# Patient Record
Sex: MALE | Race: WHITE
[De-identification: names, ages, dates, MRNs, and addresses within clinical notes are randomized per-mention and may not be internally consistent; named-entity substitution may affect disease eponyms.]

---

## 2020-02-22 ENCOUNTER — HOSPITAL ENCOUNTER (INPATIENT)
Dept: HOSPITAL 25 - ED | Age: 60
LOS: 4 days | Discharge: HOME | DRG: 247 | End: 2020-02-26
Attending: SURGERY | Admitting: INTERNAL MEDICINE
Payer: COMMERCIAL

## 2020-02-22 DIAGNOSIS — Z88.0: ICD-10-CM

## 2020-02-22 DIAGNOSIS — F17.200: ICD-10-CM

## 2020-02-22 DIAGNOSIS — K56.50: Primary | ICD-10-CM

## 2020-02-22 DIAGNOSIS — Z23: ICD-10-CM

## 2020-02-22 LAB
ALBUMIN SERPL BCG-MCNC: 4.5 G/DL (ref 3.2–5.2)
ALBUMIN/GLOB SERPL: 1.6 {RATIO} (ref 1–3)
ALP SERPL-CCNC: 54 U/L (ref 34–104)
ALT SERPL W P-5'-P-CCNC: 9 U/L (ref 7–52)
ANION GAP SERPL CALC-SCNC: 10 MMOL/L (ref 2–11)
AST SERPL-CCNC: 13 U/L (ref 13–39)
BASOPHILS # BLD AUTO: 0 10^3/UL (ref 0–0.2)
BUN SERPL-MCNC: 22 MG/DL (ref 6–24)
BUN/CREAT SERPL: 22 (ref 8–20)
CALCIUM SERPL-MCNC: 9.7 MG/DL (ref 8.6–10.3)
CHLORIDE SERPL-SCNC: 95 MMOL/L (ref 101–111)
EOSINOPHIL # BLD AUTO: 0 10^3/UL (ref 0–0.6)
GLOBULIN SER CALC-MCNC: 2.9 G/DL (ref 2–4)
GLUCOSE SERPL-MCNC: 114 MG/DL (ref 70–100)
HCO3 SERPL-SCNC: 27 MMOL/L (ref 22–32)
HCT VFR BLD AUTO: 43 % (ref 42–52)
HGB BLD-MCNC: 15.1 G/DL (ref 14–18)
LYMPHOCYTES # BLD AUTO: 1.4 10^3/UL (ref 1–4.8)
MCH RBC QN AUTO: 33 PG (ref 27–31)
MCHC RBC AUTO-ENTMCNC: 35 G/DL (ref 31–36)
MCV RBC AUTO: 92 FL (ref 80–94)
MONOCYTES # BLD AUTO: 1 10^3/UL (ref 0–0.8)
NEUTROPHILS # BLD AUTO: 6.2 10^3/UL (ref 1.5–7.7)
NRBC # BLD AUTO: 0 10^3/UL
NRBC BLD QL AUTO: 0
PLATELET # BLD AUTO: 340 10^3/UL (ref 150–450)
POTASSIUM SERPL-SCNC: 3.7 MMOL/L (ref 3.5–5)
PROT SERPL-MCNC: 7.4 G/DL (ref 6.4–8.9)
RBC # BLD AUTO: 4.63 10^6 /UL (ref 4.18–5.48)
SODIUM SERPL-SCNC: 132 MMOL/L (ref 135–145)
WBC # BLD AUTO: 8.6 10^3/UL (ref 3.5–10.8)

## 2020-02-22 PROCEDURE — 36415 COLL VENOUS BLD VENIPUNCTURE: CPT

## 2020-02-22 PROCEDURE — 83690 ASSAY OF LIPASE: CPT

## 2020-02-22 PROCEDURE — 74177 CT ABD & PELVIS W/CONTRAST: CPT

## 2020-02-22 PROCEDURE — 96361 HYDRATE IV INFUSION ADD-ON: CPT

## 2020-02-22 PROCEDURE — 80048 BASIC METABOLIC PNL TOTAL CA: CPT

## 2020-02-22 PROCEDURE — 80053 COMPREHEN METABOLIC PANEL: CPT

## 2020-02-22 PROCEDURE — 85025 COMPLETE CBC W/AUTO DIFF WBC: CPT

## 2020-02-22 PROCEDURE — 96374 THER/PROPH/DIAG INJ IV PUSH: CPT

## 2020-02-22 PROCEDURE — G0378 HOSPITAL OBSERVATION PER HR: HCPCS

## 2020-02-22 PROCEDURE — 74019 RADEX ABDOMEN 2 VIEWS: CPT

## 2020-02-22 PROCEDURE — 99284 EMERGENCY DEPT VISIT MOD MDM: CPT

## 2020-02-22 PROCEDURE — 90686 IIV4 VACC NO PRSV 0.5 ML IM: CPT

## 2020-02-22 RX ADMIN — WATER SCH: 100 INJECTION, SOLUTION INTRAVENOUS at 20:36

## 2020-02-22 RX ADMIN — SODIUM CHLORIDE SCH MLS/HR: 900 IRRIGANT IRRIGATION at 13:42

## 2020-02-22 RX ADMIN — SODIUM CHLORIDE SCH MLS/HR: 900 IRRIGANT IRRIGATION at 21:12

## 2020-02-22 RX ADMIN — ONDANSETRON PRN MG: 2 INJECTION INTRAMUSCULAR; INTRAVENOUS at 21:12

## 2020-02-22 RX ADMIN — ONDANSETRON PRN MG: 2 INJECTION INTRAMUSCULAR; INTRAVENOUS at 16:54

## 2020-02-22 RX ADMIN — NICOTINE SCH PATCH: 21 PATCH TRANSDERMAL at 14:12

## 2020-02-22 NOTE — ED
Abdominal Pain/Male





- HPI Summary


HPI Summary: 





This pt is a 58 Y/O M presenting to [facility] with a CC of vomiting and abdominal 

pain since 2/18/2020. He states that the current severity is rated a 2/10 in 

severity and is described as cramping. He states that he has been constipated 

since 2/19/2020. He also reports pain in his kidneys. He denies any fevers, 

chills, headaches, and SOB. He has no alleviating or aggravating factors. He 

states that he recently has had inguinal hernias that needed 3 repairs. He 

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 

driving. Home medications reviewed. Allergies noted.




















- History of Current Complaint


Chief Complaint: Rossy


Stated Complaint: VOMITING SINCE TUESDAY PER PT


Time Seen by Provider: 02/22/20 09:35


Hx Obtained From: Patient


Onset/Duration: Sudden Onset, Still Present


Timing: Constant


Severity Initially: Mild


Severity Currently: Mild


Pain Intensity: 2


Pain Scale Used: 0-10 Numeric


Location: Diffuse


Radiates: Yes


Radiates to: Flank - bilateral


Character: Cramping


Aggravating Factor(s): Nothing


Alleviating Factor(s): Nothing


Associated Signs And Symptoms: Positive: Negative - chills, SOB, Constipation, 

Decreased Appetite, Nausea, Vomiting, Other - flank pain.  Negative: Fever





- Allergies/Home Medications


Allergies/Adverse Reactions: 


 Allergies











Allergy/AdvReac Type Severity Reaction Status Date / Time


 


Penicillins Allergy  Unknown Verified 02/22/20 09:48





   Reaction  





   Details  











Home Medications: 


 Home Medications





NK [No Home Medications Reported]  02/22/20 [History Confirmed 02/22/20]











PMH/Surg Hx/FS Hx/Imm Hx


Previously Healthy: Yes


Endocrine/Hematology History: 


   Denies: Hx Thyroid Disease


Cardiovascular History: 


   Denies: Hx Hypertension


Sensory History: Reports: Hx Contacts or Glasses


Opthamlomology History: Reports: Hx Contacts or Glasses





- Cancer History


Hx Chemotherapy: No


Hx Radiation Therapy: No





- Surgical History


Surgical History: None





- Immunization History


Immunizations Up to Date: Yes


Infectious Disease History: No


Infectious Disease History: 


   Denies: Traveled Outside the [place] in Last 30 Days





- Family History


Known Family History: Positive: Hypertension, Diabetes





- Social History


Occupation: Employed Full-time


Lives: With Family


Alcohol Use: None


Hx Substance Use: No


Substance Use Type: Reports: None


Hx Tobacco Use: Yes


Smoking Status (MU): Current Every Day Smoker





Review of Systems


Negative: Fever, Chills


Negative: Shortness Of Breath


Positive: Abdominal Pain, Vomiting, Nausea, Other - decreased appetite 


Positive: flank pain - bilateral 


Negative: Headache


All Other Systems Reviewed And Are Negative: Yes





Physical Exam





- Summary


Physical Exam Summary: 





Constitutional: Well-developed, Well-nourished, Alert. (-) Distressed


Skin: Warm, Dry, surgical scar to mid abdomen 


HENT: Normocephalic; Atraumatic


Eyes: Conjunctiva normal


Neck: Musculoskeletal ROM normal neck. (-) JVD, (-) Stridor, (-) Tracheal 

deviation


Cardio: Rhythm regular, rate normal, Heart sounds normal; Intact distal pulses; 

The pedal pulses are 2+ and symmetric. Radial pulses are 2+ and symmetric. (-) 

Murmur


Pulmonary/Chest wall: Effort normal. (-) Respiratory distress, (-) Wheezes, (-) 

Rales


Abd: Soft, mild L lower quadrant tenderness, (-) Distension, (-) Guarding, (-) 

Rebound


Musculoskeletal: (-) Edema


Lymph: (-) Cervical adenopathy


Neuro: Alert, Oriented x3


Psych: Mood and affect Normal





Triage Information Reviewed: Yes


Vital Signs On Initial Exam: 


 Initial Vitals











Temp Pulse Resp BP Pulse Ox


 


 99.0 F   100   18   153/103   98 


 


 02/22/20 09:26  02/22/20 09:26  02/22/20 09:26  02/22/20 09:26  02/22/20 09:26











Vital Signs Reviewed: Yes





Procedures





- Sedation


Patient Received Moderate/Deep Sedation with Procedure: No





Diagnostics





- Vital Signs


 Vital Signs











  Temp Pulse Resp BP Pulse Ox


 


 02/22/20 09:26  99.0 F  100  18  153/103  98














- Laboratory


Result Diagrams: 


 02/23/20 04:49





 02/23/20 04:49


Lab Statement: Any lab studies that have been ordered have been reviewed, and 

results considered in the medical decision making process.





- CT


  ** CT A/P


CT Interpretation Completed By: Radiologist


Summary of CT Findings: 1.  SMALL BOWEL OBSTRUCTION WITH A TRANSITION POINT IN 

THE LOWER ABDOMEN.  2.  ATHEROSCLEROSIS.  3.  DIVERTICULOSIS.  4.  

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  ED physician has reviewed this 

report.





Abdominal Pain Male Course/Dx





- Course


Course Of Treatment: Patient is here with vomiting and lack of bowel movement.  

Patient has a history of bowel obstructions or workup was performed.  Patient 

had a CT scan which showed a bowel obstruction.  Surgery was called and 

admitted the patient.





- Diagnoses


Provider Diagnoses: 


 Small bowel obstruction








- Provider Notifications


Discussed Care Of Patient With: Douglas Solo


Time Discussed With Above Provider: 11:25


Instructed by Provider To: Admit As Inpatient


Admit/Transition Orders Completed By ED Provider: Yes





Discharge ED





- Sign-Out/Discharge


Documenting (check all that apply): Patient Departure - admitted





- Discharge Plan


Condition: Stable


Disposition: ADMITTED TO [facility]





- Billing Disposition and Condition


Condition: STABLE


Disposition: Admitted to [facility]





- Attestation Statements


Document Initiated by Iva: Yes


Documenting Scribe: Dirk Black


Provider For Whom Iva is Documenting (Include Credential): tahir Swanson MD 


Scribe Attestation: 


Dirk SCHWARZ, scribed for tahir Swanson MD  on 02/23/20 at 0951. 


Scribe Documentation Reviewed: Yes


Provider Attestation: 


The documentation as recorded by the Dirk gonzalez accurately reflects 

the service I personally performed and the decisions made by tahir wilson MD 


Status of Scribe Document: Viewed

## 2020-02-22 NOTE — HP
CC:  [facility]; Dr. Devon Cordova's office *

 

HISTORY AND PHYSICAL:

 

DATE OF ADMISSION:  02/22/20

 

REASON FOR ADMISSION:  Small bowel obstruction.

 

HISTORY OF PRESENT ILLNESS:  Mr. Maurilio Chambers is a 59-year-old gentleman who is 
an over the road , who about 3 or 4 days ago developed some abdominal 
distention, nausea with vomiting and lower abdominal pain.  He has been passing 
minimal flatus.  He has not had a bowel movement in several days.  He presented 
to the emergency room today and was noted to be afebrile, but has a slight 
elevation in his heart rate.  His laboratory workup was essentially 
unremarkable with normal white blood cell count, electrolytes, and renal 
function.  He has normal liver transaminases and total bilirubin.

 

He underwent a CT scan of the abdominal and pelvis, which shows finding 
consistent with a small bowel obstruction.  He has had an apparent small bowel 
resection in the past (please see below), and there was some fecalization of 
some proximal small bowel dilation with distally collapsed small bowel.  There 
is no evidence of free fluid or air.  A small amount of air in the right colon.
  There was no other acute findings.

 

Of significance, he had developed what appears to have been an incarcerated/
strangulated left inguinal hernia while traveling through [place] last 
summer and presented to the [facility] there.  He is not 
certain of the exact sequence of surgeries; however, apparently he underwent an 
open repair of a left inguinal hernia (not certain if mesh was used), and 
subsequently required a laparotomy with apparent small bowel resection for 
ischemia and/or adhesive disease.  He was hospitalized at least a month and 
recovered over the next several months that he returned to his job as a .

 

His present symptoms started 4 days ago while he was in [place], but he wanted to 
drive home prior to presenting to the emergency room.

 

PAST MEDICAL HISTORY:  Tobacco abuse.

 

PAST SURGICAL HISTORY:

1.  Open left inguinal hernia repair as above.

2.  Laparotomy with small bowel resection as per above.  We do not have records 
from this surgery.

 

MEDICATIONS:  He takes no medicines at home.

 

ALLERGIES:  PENICILLINS.

 

FAMILY HISTORY:  He is unsure of any significant findings in his family.

 

SOCIAL HISTORY:  He smokes daily and admits to smoking about too much, does not 
drink alcohol.  He lives in [place] with his girlfriend.  He spends most of 
his time driving.  He denies illicit drugs.

 

REVIEW OF SYSTEMS:  A 14 point review of systems obtained and is all positive 
per above.  He denies any diabetes, heart disease, hypertension or liver 
disease.

 

                               PHYSICAL EXAMINATION

 

GENERAL:  He is a slender male, appears to be in no apparent distress.  He is 
awake, alert and conversive.

 

VITAL SIGNS:  Temperature 99, pulse 100, blood pressure 153/103.

 

HEENT:  Sclerae is anicteric.  His oral mucosa is slightly dry.  Trachea was 
midline.

 

LUNGS:  Clear to auscultation with normal respiratory effort.

 

HEART:  Regular rate and rhythm without murmurs, rubs, or gallops.

 

ABDOMEN:  Soft and not particularly distended.  He has a well healed left groin 
oblique incision without hernia.  There is no right inguinal hernia.  He has a 
small vertical midline incision from the umbilicus down to about USP to the 
suprapubic tubercle, which is well healed without hernia.  He had some bowel 
sounds, which are slightly hyperactive throughout but not high pitched or 
tinkling. He has minimal discomfort.  There is no peritoneal irritation, rebound
, or guarding.

 

PSYCH:  He is awake alert and oriented x3.  He has normal judgment and insight

 

 IMPRESSION:  Small bowel obstruction.  He has a complicated surgical history 
as per above last summer, where he had an apparent small bowel resection with a 
stapled anastomosis.  He is not certain exactly why this was done, but it was a 
follow-up operation after undergoing what sounds like an emergent repair of a 
strangulated left inguinal hernia.

 

CT scan shows findings consistent with small bowel obstruction, however, there 
is fecalization of fluid within the more proximal small bowel up towards the 
area of the anastomosis indicating this may have been more of a chronic issue 
than just the last 3 or 4 days.  Difficult to determine if the narrowing or 
transition point is at the anastomosis by the CT scan.

 

PLAN:

1.  The patient will be admitted to the surgical service.

2.  Nasogastric tube has been inserted.  We will place the low continuous 
suction.

3.  He will be allowed ice chips and we will start on aggressive IV hydration.

4.  Analgesia will be administered as needed.

5.  Repeat laboratory values in the morning.

 

We discussed all of this with the patient and his girlfriend who is present in 
the emergency room.  Hopefully, this will not require a surgical exploration.  
We will follow him closely over the next 48 to 72 hours with the above plan.

 

 

 

[id number] #: [id number]

[facility] Psych[facility] CenterMARGARITA

## 2020-02-23 LAB
ANION GAP SERPL CALC-SCNC: 7 MMOL/L (ref 2–11)
BASOPHILS # BLD AUTO: 0.1 10^3/UL (ref 0–0.2)
BUN SERPL-MCNC: 19 MG/DL (ref 6–24)
BUN/CREAT SERPL: 21.8 (ref 8–20)
CALCIUM SERPL-MCNC: 8.3 MG/DL (ref 8.6–10.3)
CHLORIDE SERPL-SCNC: 104 MMOL/L (ref 101–111)
EOSINOPHIL # BLD AUTO: 0.1 10^3/UL (ref 0–0.6)
GLUCOSE SERPL-MCNC: 84 MG/DL (ref 70–100)
HCO3 SERPL-SCNC: 25 MMOL/L (ref 22–32)
HCT VFR BLD AUTO: 37 % (ref 42–52)
HGB BLD-MCNC: 13.1 G/DL (ref 14–18)
LYMPHOCYTES # BLD AUTO: 1.3 10^3/UL (ref 1–4.8)
MCH RBC QN AUTO: 33 PG (ref 27–31)
MCHC RBC AUTO-ENTMCNC: 35 G/DL (ref 31–36)
MCV RBC AUTO: 94 FL (ref 80–94)
MONOCYTES # BLD AUTO: 1 10^3/UL (ref 0–0.8)
NEUTROPHILS # BLD AUTO: 6.8 10^3/UL (ref 1.5–7.7)
NRBC # BLD AUTO: 0 10^3/UL
NRBC BLD QL AUTO: 0
PLATELET # BLD AUTO: 299 10^3/UL (ref 150–450)
POTASSIUM SERPL-SCNC: 3.5 MMOL/L (ref 3.5–5)
RBC # BLD AUTO: 3.99 10^6 /UL (ref 4.18–5.48)
SODIUM SERPL-SCNC: 136 MMOL/L (ref 135–145)
WBC # BLD AUTO: 9.3 10^3/UL (ref 3.5–10.8)

## 2020-02-23 RX ADMIN — FAMOTIDINE SCH MG: 10 INJECTION, SOLUTION INTRAVENOUS at 21:00

## 2020-02-23 RX ADMIN — FAMOTIDINE SCH MG: 10 INJECTION, SOLUTION INTRAVENOUS at 10:13

## 2020-02-23 RX ADMIN — NICOTINE SCH PATCH: 21 PATCH TRANSDERMAL at 10:13

## 2020-02-23 RX ADMIN — SODIUM CHLORIDE SCH MLS/HR: 900 IRRIGANT IRRIGATION at 21:01

## 2020-02-23 RX ADMIN — ONDANSETRON PRN MG: 2 INJECTION INTRAMUSCULAR; INTRAVENOUS at 04:07

## 2020-02-23 RX ADMIN — WATER SCH: 100 INJECTION, SOLUTION INTRAVENOUS at 21:03

## 2020-02-23 RX ADMIN — SODIUM CHLORIDE SCH MLS/HR: 900 IRRIGANT IRRIGATION at 04:06

## 2020-02-23 RX ADMIN — SODIUM CHLORIDE SCH MLS/HR: 900 IRRIGANT IRRIGATION at 14:27

## 2020-02-23 NOTE — PN
Progress Note





- Progress Note


Date of Service: 02/23/20


SOAP: 


Subjective:





Passing flatus overnight


No pain or nausea








Objective:


 











Temp Pulse Resp BP Pulse Ox


 


 98.1 F   78   17   133/71   99 


 


 02/23/20 07:28  02/23/20 07:28  02/23/20 07:28  02/23/20 07:28  02/23/20 07:28








 Intake & Output











 02/21/20 02/22/20 02/23/20 02/24/20





 06:59 06:59 06:59 06:59


 


Intake Total   5474 


 


Output Total   1700 


 


Balance   3774 


 


Weight   182 lb 


 


Intake:    


 


  IV Fluids   4944 


 


    NS (0.9%)   1970 


 


  IVPB   200 


 


    Tylenol IV   100 


 


  Oral   0 


 


  NG Tube Irrigate Amount   330 


 


Output:    


 


  NG Tube Drainage Amount   1400 


 


  Urine   300 


 


Other:    


 


  Estimated Void   Medium 


 


  # Voids   1 





 





PEX:


Comfortable


Lungs are clear


Cor is RRR


Abd is soft and non-distended. Few bowel sounds present-not high pitched or 

tinkling.


Mild generalized tenderness, no rebound or guarding. No hernias


Ext without edema





 Laboratory Results - last 24 hr











  02/22/20 02/22/20 02/23/20





  09:49 09:54 04:49


 


WBC   8.6  9.3


 


RBC   4.63  3.99 L


 


Hgb   15.1  13.1 L


 


Hct   43  37 L


 


MCV   92  94


 


MCH   33 H  33 H


 


MCHC   35  35


 


RDW   13  13


 


Plt Count   340  299


 


MPV   7.0 L  6.9 L


 


Neut % (Auto)   71.6  73.2


 


Lymph % (Auto)   16.0  14.2


 


Mono % (Auto)   11.6  11.0


 


Eos % (Auto)   0.5  1.0


 


Baso % (Auto)   0.3  0.6


 


Absolute Neuts (auto)   6.2  6.8


 


Absolute Lymphs (auto)   1.4  1.3


 


Absolute Monos (auto)   1.0 H  1.0 H


 


Absolute Eos (auto)   0.0  0.1


 


Absolute Basos (auto)   0.0  0.1


 


Absolute Nucleated RBC   0.0  0.0


 


Nucleated RBC %   0.0  0.0


 


Sodium  132 L  


 


Potassium  3.7  


 


Chloride  95 L  


 


Carbon Dioxide  27  


 


Anion Gap  10  


 


BUN  22  


 


Creatinine  1.00  


 


Est GFR ( Amer)  93.2  


 


Est GFR (Non-Af Amer)  77.0  


 


BUN/Creatinine Ratio  22.0 H  


 


Glucose  114 H  


 


Calcium  9.7  


 


Total Bilirubin  0.70  


 


AST  13  


 


ALT  9  


 


Alkaline Phosphatase  54  


 


Total Protein  7.4  


 


Albumin  4.5  


 


Globulin  2.9  


 


Albumin/Globulin Ratio  1.6  


 


Lipase  25  














  02/23/20





  04:49


 


WBC 


 


RBC 


 


Hgb 


 


Hct 


 


MCV 


 


MCH 


 


MCHC 


 


RDW 


 


Plt Count 


 


MPV 


 


Neut % (Auto) 


 


Lymph % (Auto) 


 


Mono % (Auto) 


 


Eos % (Auto) 


 


Baso % (Auto) 


 


Absolute Neuts (auto) 


 


Absolute Lymphs (auto) 


 


Absolute Monos (auto) 


 


Absolute Eos (auto) 


 


Absolute Basos (auto) 


 


Absolute Nucleated RBC 


 


Nucleated RBC % 


 


Sodium  136


 


Potassium  3.5


 


Chloride  104


 


Carbon Dioxide  25


 


Anion Gap  7


 


BUN  19


 


Creatinine  0.87


 


Est GFR ( Amer)  109.4


 


Est GFR (Non-Af Amer)  90.4


 


BUN/Creatinine Ratio  21.8 H


 


Glucose  84


 


Calcium  8.3 L


 


Total Bilirubin 


 


AST 


 


ALT 


 


Alkaline Phosphatase 


 


Total Protein 


 


Albumin 


 


Globulin 


 


Albumin/Globulin Ratio 


 


Lipase 























Assessment:





Small bowel obstruction secondary to adhesive disease-passing some flatus 

overnight


Labs noted-normal WBC, renal function improving, no fever or tachycardia








Plan:





Continue NGT and ice chips


IVF


Add PPI


Increase activity


AXR and labs in AM 2/24


All discussed with patient.

## 2020-02-24 LAB
ANION GAP SERPL CALC-SCNC: 8 MMOL/L (ref 2–11)
BUN SERPL-MCNC: 20 MG/DL (ref 6–24)
BUN/CREAT SERPL: 26 (ref 8–20)
CALCIUM SERPL-MCNC: 8.2 MG/DL (ref 8.6–10.3)
CHLORIDE SERPL-SCNC: 107 MMOL/L (ref 101–111)
GLUCOSE SERPL-MCNC: 75 MG/DL (ref 70–100)
HCO3 SERPL-SCNC: 23 MMOL/L (ref 22–32)
POTASSIUM SERPL-SCNC: 3.6 MMOL/L (ref 3.5–5)
SODIUM SERPL-SCNC: 138 MMOL/L (ref 135–145)

## 2020-02-24 RX ADMIN — SODIUM CHLORIDE SCH MLS/HR: 900 IRRIGANT IRRIGATION at 11:12

## 2020-02-24 RX ADMIN — SODIUM CHLORIDE SCH MLS/HR: 900 IRRIGANT IRRIGATION at 19:21

## 2020-02-24 RX ADMIN — NICOTINE SCH PATCH: 21 PATCH TRANSDERMAL at 08:25

## 2020-02-24 RX ADMIN — SODIUM CHLORIDE SCH MLS/HR: 900 IRRIGANT IRRIGATION at 03:40

## 2020-02-24 RX ADMIN — WATER SCH: 100 INJECTION, SOLUTION INTRAVENOUS at 20:13

## 2020-02-24 RX ADMIN — FAMOTIDINE SCH MG: 10 INJECTION, SOLUTION INTRAVENOUS at 08:26

## 2020-02-24 RX ADMIN — FAMOTIDINE SCH MG: 10 INJECTION, SOLUTION INTRAVENOUS at 20:12

## 2020-02-24 RX ADMIN — ONDANSETRON PRN MG: 2 INJECTION INTRAMUSCULAR; INTRAVENOUS at 10:36

## 2020-02-24 NOTE — PN
Progress Note





- Progress Note


Date of Service: 02/24/20


SOAP: 


Subjective:





Passing flatus


Had some crampy abdominal pain last night, now resolved


No BM


No N/V








Objective:





 











Temp Pulse Resp BP Pulse Ox


 


 98.8 F   71   16   135/62   96 


 


 02/24/20 07:00  02/24/20 07:00  02/24/20 07:00  02/24/20 07:00  02/24/20 07:00








 Intake & Output











 02/22/20 02/23/20 02/24/20 02/25/20





 06:59 06:59 06:59 06:59


 


Intake Total  5474 2950 


 


Output Total  1700 2050 


 


Balance  3774 900 


 


Weight  182 lb  


 


Intake:    


 


  IV Fluids  4944 2950 


 


    NS (0.9%)  1970 2950 


 


  IVPB  200  


 


    Tylenol IV  100  


 


  Oral  0 0 


 


  NG Tube Irrigate Amount  330  


 


Output:    


 


  NG Tube Drainage Amount  1400 925 


 


  Urine  300 1125 


 


Other:    


 


  Estimated Void  Medium  


 


  # Bowel Movements   0 


 


  # Voids  1  





 


PEX:


Comfortable


Lungs are clean


Cor is RRR


Abd is soft and non-distended. Bowel sounds are present, hypoactive. Mild 

tenderness. No hernias


Ext without edema





AXR 2/24 reviewed--stool in right colon, no dilated small bowel.





Assessment:





Small bowel obstruction-appears to be improving, minimal NGT output last 12 

hours, passing flatus








Plan:





D/C NGT


Sips of clears


Increase activity


Follow for now

## 2020-02-25 LAB
ANION GAP SERPL CALC-SCNC: 5 MMOL/L (ref 2–11)
BUN SERPL-MCNC: 11 MG/DL (ref 6–24)
BUN/CREAT SERPL: 15.1 (ref 8–20)
CALCIUM SERPL-MCNC: 8.1 MG/DL (ref 8.6–10.3)
CHLORIDE SERPL-SCNC: 108 MMOL/L (ref 101–111)
GLUCOSE SERPL-MCNC: 110 MG/DL (ref 70–100)
HCO3 SERPL-SCNC: 26 MMOL/L (ref 22–32)
POTASSIUM SERPL-SCNC: 3.9 MMOL/L (ref 3.5–5)
SODIUM SERPL-SCNC: 139 MMOL/L (ref 135–145)

## 2020-02-25 RX ADMIN — NICOTINE SCH PATCH: 21 PATCH TRANSDERMAL at 08:50

## 2020-02-25 RX ADMIN — SODIUM CHLORIDE SCH MLS/HR: 900 IRRIGANT IRRIGATION at 01:41

## 2020-02-25 RX ADMIN — FAMOTIDINE SCH MG: 10 INJECTION, SOLUTION INTRAVENOUS at 10:07

## 2020-02-25 RX ADMIN — FAMOTIDINE SCH MG: 10 INJECTION, SOLUTION INTRAVENOUS at 20:44

## 2020-02-25 RX ADMIN — WATER SCH: 100 INJECTION, SOLUTION INTRAVENOUS at 21:34

## 2020-02-25 RX ADMIN — SODIUM CHLORIDE SCH MLS/HR: 900 IRRIGANT IRRIGATION at 10:01

## 2020-02-25 NOTE — PN
Progress Note





- Progress Note


Date of Service: 02/25/20


Note: 





S: patient seen w/ Dr. Schmidt. Denies pain. Passing flatus and has had BMs 

both yesterday and this am. Would like to advance diet.





O: 


 Vital Signs - 8 hr











  02/25/20 02/25/20 02/25/20





  03:33 07:57 08:00


 


Temperature 98.1 F 97.9 F 


 


Pulse Rate 60 62 


 


Respiratory 16 18 20





Rate   


 


Blood Pressure 117/71 128/76 





(mmHg)   


 


O2 Sat by Pulse 100 99 





Oximetry   








Intake and Output Last 24 Hours











 02/23/20 02/24/20 02/25/20 02/26/20





 06:59 06:59 06:59 06:59


 


Intake Total 5474 2950 2710 995


 


Output Total 1700 2050 1000 1400


 


Balance 3774 900 1710 -405


 


Weight 182 lb   


 


Intake:    


 


  IV Fluids 4944 2950 980 995


 


    NS (0.9%) 1970 2950 980 995


 


  IVPB 200  990 


 


    NS (0.9%)   990 


 


    Tylenol    


 


  Oral 0 0 740 


 


  NG Tube Irrigate Amount 330   


 


Output:    


 


  NG Tube Drainage Amount 1400 925  


 


  Urine 300 1125 1000 1400


 


Other:    


 


  Estimated Void Medium  Medium 


 


  # Bowel Movements  0 1 


 


  Estimated Stool Amount   Large 


 


  # Voids 1   








Gen: appears comfortable; NAD


Heart: reg


LUngs: clear ant


Abd: +BS; soft, nontender





A: SBO, resolving





P: will try full liqs; decrease IVF; prob home tomorrow

## 2020-02-26 VITALS — SYSTOLIC BLOOD PRESSURE: 124 MMHG | DIASTOLIC BLOOD PRESSURE: 79 MMHG

## 2020-02-26 RX ADMIN — FAMOTIDINE SCH MG: 10 INJECTION, SOLUTION INTRAVENOUS at 08:32

## 2020-02-26 RX ADMIN — NICOTINE SCH PATCH: 21 PATCH TRANSDERMAL at 08:33

## 2020-02-26 RX ADMIN — SODIUM CHLORIDE SCH MLS/HR: 900 IRRIGANT IRRIGATION at 02:17

## 2020-02-26 NOTE — PN
Progress Note





- Progress Note


Date of Service: 02/26/20


SOAP: 


Subjective: NAD  Reports + Flatus, - BM  


[]








Objective:


 Vital Signs











Temp  98.0 F   02/26/20 08:02


 


Pulse  59   02/26/20 08:02


 


Resp  16   02/26/20 08:02


 


BP  124/79   02/26/20 08:02


 


Pulse Ox  100   02/26/20 08:02








 Intake & Output











 02/25/20 02/26/20 02/26/20





 18:59 06:59 18:59


 


Intake Total 1475 2127 


 


Output Total 2000 2870 


 


Balance -525 -743 


 


Intake:   


 


  IV Fluids 995 997 


 


    NS (0.9%) 995 997 


 


  Oral 480 1130 


 


Output:   


 


  Urine 2000 2870 








PEX


Gen:    NAD


Chest: CTAB


CVS:    bradycardic rate, reg rhythm


ABD:    soft, non tender, + BS's all quad's, no guarding


EXT:     calves Soft, non tender


[]








Assessment: 60 yo male with SBO treated conservatively responding well to same.

  BM yesterday AM, continues to have flatus


[]








Plan: continue fulls, slowly advance diet with return of bowel function.  OK to 

discharge home, Follow up as needed.  Above D/W Dr Schmidt


[]

## 2020-02-27 NOTE — DS
Discharge Summary





Surgeon: Brayan SANTIAGO





Admit Date:2/22/2020





Discharge Date:2/26/2020





Admission DX:SBO





Discharge DX:SBO Resolved





Condition at Discharge:Stable





Date of D/C PEX: 


Chest:CTAB


CVS:RRR


ABD:Soft, ND/NT no guarding


EXT:calves soft NT





Procedures Performed: NGT, IV Hydration, Observation





Hospital Course: 58 yo male presented with a SBO.  Was followed on the surgical 

hernandez with iv fluids, an NGT and observation.  NG tube placed on HD 1.   on HD 2 

small flatus, AXR showed improvement.  HD 3, NG out + BM clr liquids.  HD 4 

advance to full liquids, + BM, 


D/C Home





Discharge instructions were given to the patient regarding Diet, Medications, 

Activity, and post operative Follow up.  All Questions were answered.  


Discharged Home in Stable Condition on  2/26/2020